# Patient Record
Sex: MALE
[De-identification: names, ages, dates, MRNs, and addresses within clinical notes are randomized per-mention and may not be internally consistent; named-entity substitution may affect disease eponyms.]

---

## 2019-02-27 ENCOUNTER — HOSPITAL ENCOUNTER (EMERGENCY)
Dept: HOSPITAL 25 - UCEAST | Age: 54
Discharge: HOME | End: 2019-02-27
Payer: COMMERCIAL

## 2019-02-27 VITALS — DIASTOLIC BLOOD PRESSURE: 78 MMHG | SYSTOLIC BLOOD PRESSURE: 135 MMHG

## 2019-02-27 DIAGNOSIS — J01.80: Primary | ICD-10-CM

## 2019-02-27 DIAGNOSIS — J02.9: ICD-10-CM

## 2019-02-27 DIAGNOSIS — Z88.1: ICD-10-CM

## 2019-02-27 PROCEDURE — 99202 OFFICE O/P NEW SF 15 MIN: CPT

## 2019-02-27 PROCEDURE — 87651 STREP A DNA AMP PROBE: CPT

## 2019-02-27 PROCEDURE — G0463 HOSPITAL OUTPT CLINIC VISIT: HCPCS

## 2019-02-27 NOTE — UC
Throat Pain/Nasal Ryan HPI





- HPI Summary


HPI Summary: 





54 y/o male presents to the urgent care c/o sinus congestion w/ yellowish nasal 

discharge, chills, sore throat, sinus pressure and mild HA for the past 2 days. 

Pt reports Hx of Chronic Sinusitis and has been taking Claritin D and using 

Flonase Nasal Spray to alleviate symptoms w/o any improvement. Now he feels 

symptoms are worsen and he needs antibiotic treatment. he gets every month 

allergy shots. Pain w/ swallowing is 4/10. Pt denies fever, dizziness, ear pain

, cough, SOB, chest pain, abdominal pain, N/V/D.








- History of Current Complaint


Chief Complaint: UCRespiratory


Stated Complaint: SINUS COMPLAINT


Time Seen by Provider: 02/27/19 11:20


Hx Obtained From: Patient


Onset/Duration: Gradual Onset, Lasting Days - 2 days, Still Present, Worse 

Since - today


Severity: Moderate


Pain Intensity: 4


Pain Scale Used: 0-10 Numeric


Cough: Nonproductive


Associated Signs & Symptoms: Positive: Dysphagia - mild sore thraot, Sinus 

Discomfort, Nasal Discharge - yellowish.  Negative: Fever





- Epiglottits Risk Factors


Epiglottis Risk Factors: Negative





- Allergies/Home Medications


Allergies/Adverse Reactions: 


 Allergies











Allergy/AdvReac Type Severity Reaction Status Date / Time


 


azithromycin Allergy  swelling Verified 02/27/19 09:43





[From Zithromax Z-Matt]   hives  











Home Medications: 


 Home Medications





Mometasone Furoate 50 mg .ROUTE DAILY 02/27/19 [History Confirmed 02/27/19]











PMH/Surg Hx/FS Hx/Imm Hx


Previously Healthy: Yes


Other Respiratory History: chronic sinusitis





- Surgical History


Surgical History: Yes


Surgery Procedure, Year, and Place: hernias x 2 gall bladder





- Family History


Known Family History: Positive: Hypertension, Diabetes





- Social History


Occupation: Employed Full-time


Lives: With Family


Alcohol Use: Rare


Substance Use Type: None


Smoking Status (MU): Never Smoked Tobacco





Review of Systems


All Other Systems Reviewed And Are Negative: Yes


Constitutional: Positive: Chills


Skin: Positive: Negative


Eyes: Positive: Negative


ENT: Positive: Sore Throat - mild, Nasal Discharge - yellowish, Sinus Congestion

, Sinus Pain/Tenderness, Other - PND


Respiratory: Positive: Cough - dry


Cardiovascular: Positive: Negative


Gastrointestinal: Positive: Negative


Genitourinary: Positive: Negative


Motor: Positive: Negative


Neurovascular: Positive: Negative


Musculoskeletal: Positive: Negative


Neurological: Positive: Headache - mild


Psychological: Positive: Negative


Is Patient Immunocompromised?: No





Physical Exam





- Summary


Physical Exam Summary: 





Vitals: reviewed


General: Well developed, well-nourished male patient with NAD.


Head and face: Normocephalic and atraumatic, Positive tenderness over the 

frontal and maxillary sinuses..


Eyes: PERRLA, EOMI x 2. Normal conjunctiva. No eye discharge.


ENT: Ears and TM with normal limits. 


Nose: edematous and erythematous nasal mucosa with  with yellowish discharge 

and erythematous mucosa. Pharynx with erythema, uvula with erythema and in the 

mid line, no exudate. No Tonsillar enlargement, +PND yellowish


Neck: Supple, no JVD, no carotid bruits and no lymphadenopathy.


Lungs: clear, no rales, no rhonchi, no wheezes.


CVS: RRR, S1 and S2 present no murmurs or gallops appreciated.


Abdomen: soft nontender with positive bowel sounds.


Extremities: no edema noted.


Neuro: WNL. 


Skin: warm and dry





Triage Information Reviewed: Yes


Vital Signs: 


 Initial Vital Signs











Temp  97.8 F   02/27/19 09:39


 


Pulse  72   02/27/19 09:39


 


Resp  18   02/27/19 09:39


 


BP  135/78   02/27/19 09:39


 


Pulse Ox  100   02/27/19 09:39














Throat Pain/Nasal Course/Dx





- Course


Course Of Treatment: 54 y/o male presents to the urgent care c/o sinus 

congestion w/ yellowish nasal discharge, chills, sore throat, sinus pressure 

and mild HA for the past 2 days. Pt reports Hx of Chronic Sinusitis and has 

been taking Claritin D and using Flonase Nasal Spray to alleviate symptoms w/o 

any improvement. Now he feels symptoms are worsen and he needs antibiotic 

treatment. he gets every month allergy shots. Pain w/ swallowing is 4/10. Pt 

denies fever, dizziness, ear pain, cough, SOB, chest pain, abdominal pain, N/V/

D. Hx obtained. Pt w/ Bacterial sinusitis and pharyngitis on examination.Rapid 

strep: negative.  Pt with Hx of Chronic sinusitis and doing symptomatic 

treatment w/o any improvement. Pt requests antibiotic treatment.  Pt Rx 

Augmentin PO, but advised only to start taking it only if symptoms worsen. 

Advised to continue w/  flonase nasal spray, saline drops and Claritine D PO to 

clear sinuses.  Discharge instructions explained to Pt. Advised to Return to 

the clinic or  PCP if symptoms do not improve.Pt understood and agreed with 

plan of care.





- Differential Dx/Diagnosis


Differential Diagnosis/HQI/PQRI: Influenza, Laryngitis, Pharyngitis, Sinusitis, 

Tonsillitis, URI


Provider Diagnosis: 


 Acute bacterial sinusitis, Pharyngitis








Discharge





- Sign-Out/Discharge


Documenting (check all that apply): Patient Departure - d/c home


All imaging exams completed and their final reports reviewed: No Studies





- Discharge Plan


Condition: Stable


Disposition: HOME


Prescriptions: 


Amoxicillin/Clavulanate TAB* [Augmentin *] 875 mg PO BID #20 tab


Patient Education Materials:  Pharyngitis (ED), Sinusitis (ED)


Referrals: 


Yumiko Callaway, NP [Primary Care Provider] - 3 Days


Additional Instructions: 


1- Please increase fluid intake and rest. Start Augmentin PO only if symptoms 

worsen despite the symptomatic treatment.


2-Continue Using Flonase nasal spray as directed to help drain fluid. Also buy 

saline drops to clear sinuses as directed 


3-Continue taking  Claritin D PO to alleviates sinus congestion


4-Return to the clinic or  PCP if symptoms do not improve for further 

management and treatment


5- Rapid Strep was negative








- Billing Disposition and Condition


Condition: STABLE


Disposition: Home